# Patient Record
Sex: FEMALE | Race: WHITE | Employment: FULL TIME | URBAN - METROPOLITAN AREA
[De-identification: names, ages, dates, MRNs, and addresses within clinical notes are randomized per-mention and may not be internally consistent; named-entity substitution may affect disease eponyms.]

---

## 2019-03-05 ENCOUNTER — ANESTHESIA EVENT (OUTPATIENT)
Dept: SURGERY | Age: 49
End: 2019-03-05
Payer: COMMERCIAL

## 2019-03-05 RX ORDER — SODIUM CHLORIDE 0.9 % (FLUSH) 0.9 %
5-40 SYRINGE (ML) INJECTION EVERY 8 HOURS
Status: CANCELLED | OUTPATIENT
Start: 2019-03-05

## 2019-03-05 RX ORDER — SODIUM CHLORIDE 0.9 % (FLUSH) 0.9 %
5-40 SYRINGE (ML) INJECTION AS NEEDED
Status: CANCELLED | OUTPATIENT
Start: 2019-03-05

## 2019-03-05 NOTE — H&P
HPI:   1/9/2013- Right/Left double bunionectomies. . ~2 months ago right big toe pain    Location of pain - great toe      Type/severity pain -  aching burning    Aggravating factors -  shoes; prolonged standing/walking  Alleviating factors -  rest     PMSFH:  Included on the patient history form ; reviewed   MEDS:   Lexapro  ALLERGIES:  NKDA  PMH:  Depression  SOCIAL: Teacher, , nonsmoker    ROS:  Per POA form: positive and negative system reviews were discussed. I tried to relate any positive system review to the musculoskeletal complaint. For all others, recommend the PCP or any specialists    PE: The physical exam encompasses evaluation of both lower extremities:  Patient is alert and oriented. Nutrition, appearance and mood all okay. RESPIRATIONS:  Unlabored with no obvious shortness of breath. CV:  Appears to have pedal pulses, color, capillary refill, subungual color. Varicosities     NEURO:  No abnormal reflexes or clonus. Sensation appears mostly intact to light touch and sharp/dull discrimination. SLR negative. No popliteal tenderness     SKIN:  Age nails; no swelling; no keloids; MTP thick      MS:  Standing = HVI. Gait = limited push off roll    Right big toe = 5-40 painful MTP motion; Left big toe 20-50 with no pain     Okay ankle, subtalar, tarsal motion. 5/5 strength ; no instability or crepitance     XR: Right, Left side - Standing AP, lateral, oblique of the foot taken prior w/ 1st MTP joint arthritis/spurring. XR Impression:      As noted above    DIAGNOSIS: Right bunion, hallux rigidus          The patient and I discussed the above diagnoses with surgical and conservative options.   Discussed Jakob Manley Dansko shoes       Injection - offered today   Medication - Lujans    Discussed multiple surgical options - cheilectomy vs. fusion vs. joint replacement      Surgery - Right   big toe cheilectomy implant      big toe phalangeal osteotomy     op - anesthesia choice - 1 hour sag. saw, drill twists, K-wires, bone wax, Cartiva implant           The patient understands the diagnosis of w/ conservative and surgical treatment. Surgery, the risks and complications, and the expected postoperative course are all outlined. Understands the fact that this is arthritis and, that through the non-fusion surgical procedure,  I simply will try to decrease the prominence, decreases the impingement and pain and improve toe motion. The patient also understands that I cannot alter the long-term outcome of arthritis and that arthritis is something that will progress in the future which may necessitate a fusion (outlined and offered). The patient is aware and accepts all of it as a condition of treatment. Understands WB in postop shoe and the fact that it may take a year for all the swelling to resolve.   The patient accepts and understands future fusion or implant

## 2019-03-06 ENCOUNTER — HOSPITAL ENCOUNTER (OUTPATIENT)
Age: 49
Setting detail: OUTPATIENT SURGERY
Discharge: HOME | End: 2019-03-06
Attending: ORTHOPAEDIC SURGERY | Admitting: ORTHOPAEDIC SURGERY
Payer: COMMERCIAL

## 2019-03-06 ENCOUNTER — ANESTHESIA (OUTPATIENT)
Dept: SURGERY | Age: 49
End: 2019-03-06
Payer: COMMERCIAL

## 2019-03-06 VITALS
SYSTOLIC BLOOD PRESSURE: 144 MMHG | DIASTOLIC BLOOD PRESSURE: 96 MMHG | HEART RATE: 64 BPM | RESPIRATION RATE: 16 BRPM | TEMPERATURE: 97.4 F | OXYGEN SATURATION: 98 %

## 2019-03-06 PROCEDURE — 74011250636 HC RX REV CODE- 250/636: Performed by: PHYSICIAN ASSISTANT

## 2019-03-06 PROCEDURE — 77030002916 HC SUT ETHLN J&J -A: Performed by: ORTHOPAEDIC SURGERY

## 2019-03-06 PROCEDURE — 76010000161 HC OR TIME 1 TO 1.5 HR INTENSV-TIER 1: Performed by: ORTHOPAEDIC SURGERY

## 2019-03-06 PROCEDURE — 76210000063 HC OR PH I REC FIRST 0.5 HR: Performed by: ORTHOPAEDIC SURGERY

## 2019-03-06 PROCEDURE — 76210000020 HC REC RM PH II FIRST 0.5 HR: Performed by: ORTHOPAEDIC SURGERY

## 2019-03-06 PROCEDURE — 77030018836 HC SOL IRR NACL ICUM -A: Performed by: ORTHOPAEDIC SURGERY

## 2019-03-06 PROCEDURE — 76060000033 HC ANESTHESIA 1 TO 1.5 HR: Performed by: ORTHOPAEDIC SURGERY

## 2019-03-06 PROCEDURE — 76942 ECHO GUIDE FOR BIOPSY: CPT | Performed by: ORTHOPAEDIC SURGERY

## 2019-03-06 PROCEDURE — C1776 JOINT DEVICE (IMPLANTABLE): HCPCS | Performed by: ORTHOPAEDIC SURGERY

## 2019-03-06 PROCEDURE — 74011250636 HC RX REV CODE- 250/636

## 2019-03-06 PROCEDURE — 76010010054 HC POST OP PAIN BLOCK: Performed by: ORTHOPAEDIC SURGERY

## 2019-03-06 PROCEDURE — 77030006788 HC BLD SAW OSC STRY -B: Performed by: ORTHOPAEDIC SURGERY

## 2019-03-06 PROCEDURE — 74011250636 HC RX REV CODE- 250/636: Performed by: ANESTHESIOLOGY

## 2019-03-06 PROCEDURE — 77030000032 HC CUF TRNQT ZIMM -B: Performed by: ORTHOPAEDIC SURGERY

## 2019-03-06 PROCEDURE — 77030031139 HC SUT VCRL2 J&J -A: Performed by: ORTHOPAEDIC SURGERY

## 2019-03-06 PROCEDURE — 74011250637 HC RX REV CODE- 250/637: Performed by: ANESTHESIOLOGY

## 2019-03-06 DEVICE — 10 MM SYNTHETIC CARTILAGE IMPLANT
Type: IMPLANTABLE DEVICE | Site: FOOT | Status: FUNCTIONAL
Brand: CARTIVA SYNTHETIC CARTILAGE IMPLANT

## 2019-03-06 RX ORDER — ONDANSETRON 2 MG/ML
INJECTION INTRAMUSCULAR; INTRAVENOUS AS NEEDED
Status: DISCONTINUED | OUTPATIENT
Start: 2019-03-06 | End: 2019-03-06 | Stop reason: HOSPADM

## 2019-03-06 RX ORDER — ROPIVACAINE HYDROCHLORIDE 5 MG/ML
INJECTION, SOLUTION EPIDURAL; INFILTRATION; PERINEURAL
Status: COMPLETED | OUTPATIENT
Start: 2019-03-06 | End: 2019-03-06

## 2019-03-06 RX ORDER — MIDAZOLAM HYDROCHLORIDE 1 MG/ML
INJECTION, SOLUTION INTRAMUSCULAR; INTRAVENOUS AS NEEDED
Status: DISCONTINUED | OUTPATIENT
Start: 2019-03-06 | End: 2019-03-06 | Stop reason: HOSPADM

## 2019-03-06 RX ORDER — SODIUM CHLORIDE, SODIUM LACTATE, POTASSIUM CHLORIDE, CALCIUM CHLORIDE 600; 310; 30; 20 MG/100ML; MG/100ML; MG/100ML; MG/100ML
100 INJECTION, SOLUTION INTRAVENOUS CONTINUOUS
Status: DISCONTINUED | OUTPATIENT
Start: 2019-03-06 | End: 2019-03-06 | Stop reason: HOSPADM

## 2019-03-06 RX ORDER — MIDAZOLAM HYDROCHLORIDE 1 MG/ML
2 INJECTION, SOLUTION INTRAMUSCULAR; INTRAVENOUS
Status: COMPLETED | OUTPATIENT
Start: 2019-03-06 | End: 2019-03-06

## 2019-03-06 RX ORDER — HYDROCODONE BITARTRATE AND ACETAMINOPHEN 7.5; 325 MG/1; MG/1
1 TABLET ORAL AS NEEDED
Status: DISCONTINUED | OUTPATIENT
Start: 2019-03-06 | End: 2019-03-06 | Stop reason: HOSPADM

## 2019-03-06 RX ORDER — FENTANYL CITRATE 50 UG/ML
100 INJECTION, SOLUTION INTRAMUSCULAR; INTRAVENOUS ONCE
Status: COMPLETED | OUTPATIENT
Start: 2019-03-06 | End: 2019-03-06

## 2019-03-06 RX ORDER — LIDOCAINE HYDROCHLORIDE 10 MG/ML
0.1 INJECTION INFILTRATION; PERINEURAL AS NEEDED
Status: DISCONTINUED | OUTPATIENT
Start: 2019-03-06 | End: 2019-03-06 | Stop reason: HOSPADM

## 2019-03-06 RX ORDER — OXYCODONE HYDROCHLORIDE 5 MG/1
5 TABLET ORAL
Status: DISCONTINUED | OUTPATIENT
Start: 2019-03-06 | End: 2019-03-06 | Stop reason: HOSPADM

## 2019-03-06 RX ORDER — PROPOFOL 10 MG/ML
INJECTION, EMULSION INTRAVENOUS
Status: DISCONTINUED | OUTPATIENT
Start: 2019-03-06 | End: 2019-03-06 | Stop reason: HOSPADM

## 2019-03-06 RX ORDER — SODIUM CHLORIDE 0.9 % (FLUSH) 0.9 %
5-40 SYRINGE (ML) INJECTION AS NEEDED
Status: DISCONTINUED | OUTPATIENT
Start: 2019-03-06 | End: 2019-03-06 | Stop reason: HOSPADM

## 2019-03-06 RX ORDER — ROPIVACAINE HYDROCHLORIDE 10 MG/ML
INJECTION EPIDURAL; INFILTRATION; PERINEURAL
Status: COMPLETED | OUTPATIENT
Start: 2019-03-06 | End: 2019-03-06

## 2019-03-06 RX ORDER — NALOXONE HYDROCHLORIDE 0.4 MG/ML
0.1 INJECTION, SOLUTION INTRAMUSCULAR; INTRAVENOUS; SUBCUTANEOUS AS NEEDED
Status: DISCONTINUED | OUTPATIENT
Start: 2019-03-06 | End: 2019-03-06 | Stop reason: HOSPADM

## 2019-03-06 RX ORDER — PROPOFOL 10 MG/ML
INJECTION, EMULSION INTRAVENOUS AS NEEDED
Status: DISCONTINUED | OUTPATIENT
Start: 2019-03-06 | End: 2019-03-06 | Stop reason: HOSPADM

## 2019-03-06 RX ORDER — SODIUM CHLORIDE 0.9 % (FLUSH) 0.9 %
5-40 SYRINGE (ML) INJECTION EVERY 8 HOURS
Status: DISCONTINUED | OUTPATIENT
Start: 2019-03-06 | End: 2019-03-06 | Stop reason: HOSPADM

## 2019-03-06 RX ORDER — HYDROMORPHONE HYDROCHLORIDE 2 MG/ML
0.5 INJECTION, SOLUTION INTRAMUSCULAR; INTRAVENOUS; SUBCUTANEOUS
Status: DISCONTINUED | OUTPATIENT
Start: 2019-03-06 | End: 2019-03-06 | Stop reason: HOSPADM

## 2019-03-06 RX ORDER — FAMOTIDINE 20 MG/1
20 TABLET, FILM COATED ORAL ONCE
Status: COMPLETED | OUTPATIENT
Start: 2019-03-06 | End: 2019-03-06

## 2019-03-06 RX ORDER — CEFAZOLIN SODIUM/WATER 2 G/20 ML
2 SYRINGE (ML) INTRAVENOUS ONCE
Status: COMPLETED | OUTPATIENT
Start: 2019-03-06 | End: 2019-03-06

## 2019-03-06 RX ORDER — SODIUM CHLORIDE 9 MG/ML
25 INJECTION, SOLUTION INTRAVENOUS CONTINUOUS
Status: DISCONTINUED | OUTPATIENT
Start: 2019-03-06 | End: 2019-03-06 | Stop reason: HOSPADM

## 2019-03-06 RX ADMIN — Medication 2 G: at 15:54

## 2019-03-06 RX ADMIN — PROPOFOL 140 MCG/KG/MIN: 10 INJECTION, EMULSION INTRAVENOUS at 16:03

## 2019-03-06 RX ADMIN — ROPIVACAINE HYDROCHLORIDE 15 ML: 10 INJECTION EPIDURAL; INFILTRATION; PERINEURAL at 14:39

## 2019-03-06 RX ADMIN — ROPIVACAINE HYDROCHLORIDE 10 ML: 5 INJECTION, SOLUTION EPIDURAL; INFILTRATION; PERINEURAL at 14:41

## 2019-03-06 RX ADMIN — MIDAZOLAM HYDROCHLORIDE 2 MG: 2 INJECTION, SOLUTION INTRAMUSCULAR; INTRAVENOUS at 14:33

## 2019-03-06 RX ADMIN — PROPOFOL 50 MG: 10 INJECTION, EMULSION INTRAVENOUS at 16:03

## 2019-03-06 RX ADMIN — MIDAZOLAM HYDROCHLORIDE 2 MG: 1 INJECTION, SOLUTION INTRAMUSCULAR; INTRAVENOUS at 15:54

## 2019-03-06 RX ADMIN — FENTANYL CITRATE 100 MCG: 50 INJECTION INTRAMUSCULAR; INTRAVENOUS at 14:33

## 2019-03-06 RX ADMIN — SODIUM CHLORIDE, SODIUM LACTATE, POTASSIUM CHLORIDE, AND CALCIUM CHLORIDE 100 ML/HR: 600; 310; 30; 20 INJECTION, SOLUTION INTRAVENOUS at 13:45

## 2019-03-06 RX ADMIN — FAMOTIDINE 20 MG: 20 TABLET ORAL at 14:30

## 2019-03-06 RX ADMIN — ONDANSETRON 4 MG: 2 INJECTION INTRAMUSCULAR; INTRAVENOUS at 16:46

## 2019-03-06 NOTE — ANESTHESIA PREPROCEDURE EVALUATION
Anesthetic History               Review of Systems / Medical History  Patient summary reviewed    Pulmonary                   Neuro/Psych         Psychiatric history (Depression)     Cardiovascular                  Exercise tolerance: >4 METS     GI/Hepatic/Renal                Endo/Other             Other Findings            Physical Exam    Airway  Mallampati: II    Neck ROM: normal range of motion   Mouth opening: Normal     Cardiovascular  Regular rate and rhythm,  S1 and S2 normal,  no murmur, click, rub, or gallop             Dental  No notable dental hx       Pulmonary  Breath sounds clear to auscultation               Abdominal         Other Findings            Anesthetic Plan    ASA: 1  Anesthesia type: total IV anesthesia            Anesthetic plan and risks discussed with: Patient

## 2019-03-06 NOTE — H&P
Outpatient Surgery History and Physical:  Piedad Fair     CHIEF COMPLAINT:   LE    PE:     Visit Vitals  LMP 12/31/2012       Heart:  No noted problems   Lungs:  No noted problems        Past Medical History: There are no active problems to display for this patient. Surgical History:   Past Surgical History:   Procedure Laterality Date    ABDOMEN SURGERY PROC UNLISTED      tiffany    HX BUNIONECTOMY Bilateral     HX ORTHOPAEDIC      HX PARTIAL HYSTERECTOMY         Social History: Patient  reports that  has never smoked. she has never used smokeless tobacco. She reports that she does not drink alcohol or use drugs. Family History:   Family History   Problem Relation Age of Onset    Heart Disease Father 62        MI       Allergies: Reviewed per EMR  No Known Allergies    Medications:    No current facility-administered medications on file prior to encounter. Current Outpatient Medications on File Prior to Encounter   Medication Sig    escitalopram (LEXAPRO) 20 mg tablet Take 20 mg by mouth daily. Indications: pm       The surgery is planned for the Right big toe . History and physical have been reviewed. There have been no significant  changes since the completion of the updated history and physical.    Necessity for the procedure/care is still present and the updated history and physical office notes outline the full long term history of the problem. Please see the updated office notes for the full musculoskeletal examination and surgical plan.     Signed By: Pushpa Hodge MD     March 6, 2019 8:13 AM

## 2019-03-06 NOTE — ANESTHESIA PROCEDURE NOTES
Peripheral Block    Start time: 3/6/2019 2:39 PM  Performed by: Marylene Gehrig, MD  Authorized by: Marylene Gehrig, MD       Pre-procedure: Indications: at surgeon's request and post-op pain management    Preanesthetic Checklist: patient identified, risks and benefits discussed, site marked, timeout performed, anesthesia consent given and patient being monitored    Timeout Time: 14:39          Block Type:   Block Type:   Adductor canal  Laterality:  Right  Monitoring:  Continuous pulse ox, frequent vital sign checks, heart rate, oxygen and responsive to questions  Injection Technique:  Single shot  Procedures: ultrasound guided    Patient Position: supine  Prep: DuraPrep    Location:  Mid thigh  Needle Type:  Stimuplex  Needle Gauge:  20 G  Needle Localization:  Nerve stimulator and ultrasound guidance  Motor Response: minimal motor response >0.4 mA      Assessment:  Number of attempts:  1  Injection Assessment:  Incremental injection every 5 mL, local visualized surrounding nerve on ultrasound, negative aspiration for blood, no intravascular symptoms, no paresthesia and ultrasound image on chart  Patient tolerance:  Patient tolerated the procedure well with no immediate complications

## 2019-03-06 NOTE — DISCHARGE INSTRUCTIONS
INSTRUCTIONS FOLLOWING FOOT SURGERY    ACTIVITY  Elevate foot. No Ice  Protected partial weight bearing on the heel only as tolerated in post op shoe after full sensation returns. Let the office know if dressing gets saturated with water . DIET  Day of Surgery: Clear liquids until no nausea or vomiting; then light, bland diet (Baked chicken, plain rice, grits, scrambled egg, toast). Nothing Greasy, fried or spicy today  Advance to regular diet on second day, unless your doctor orders otherwise. PAIN  Take pain medications as directed by your doctor. Call your doctor if pain is NOT relieved by medication. PAIN MED SIDE EFFECTS  Constipation: Lots of fluids, try prune juice, then OTC stool softeners if necessary  Nausea: Take medication with food. DRESSING CARE Keep dry and in place until follow up appointment    CALL YOUR DOCTOR IF YOU HAVE  Excessive bleeding that does not stop after holding mild pressure over the area. Temperature of 101 degrees or above. Redness, excessive swelling or bruising, and/or green or yellow, smelly discharge from incision. Loss of sensation - cold, white or blue toes. AFTER ANESTHESIA  For the first 24 hours and while taking narcotics for pain: DO NOT Drive, Drink Alcoholic beverages, or make important Decisions. Be aware of dizziness following anesthesia and while taking pain medication. Preventing Infection at Home  We care about preventing infection and avoiding the spread of germs - not only when you are in the hospital but also when you return home. When you return home from the hospital, its important to take the following steps to help prevent infection and avoid spreading germs that could infect you and others. Ask everyone in your home to follow these guidelines, too. Clean Your Hands  · Clean your hands whenever your hands are visibly dirty, before you eat, before or after touching your mouth, nose or eyes, and before preparing food.  Clean them after contact with body fluids, using the restroom, touching animals or changing diapers. · When washing hands, wet them with warm water and work up a lather. Rub hands for at least 15 seconds, then rinse them and pat them dry with a clean towel or paper towel. · When using hand sanitizers, it should take about 15 seconds to rub your hands dry. If not, you probably didnt apply enough . Cover Your Sneeze or Cough  Germs are released into the air whenever you sneeze or cough. To prevent the spread of infection:  · Turn away from other people before coughing or sneezing. · Cover your mouth or nose with a tissue when you cough or sneeze. Put the tissue in the trash. · If you dont have a tissue, cough or sneeze into your upper sleeve, not your hands. · Always clean your hands after coughing or sneezing. Care for Wounds  Your skin is your bodys first line of defense against germs, but an open wound leaves an easy way for germs to enter your body. To prevent infection:  · Clean your hands before and after changing wound dressings, and wear gloves to change dressings if recommended by your doctor. · Take special care with IV lines or other devices inserted into the body. If you must touch them, clean your hands first.  · Follow any specific instructions from your doctor to care for your wounds. Contact your doctor if you experience any signs of infection, such as fever or increased redness at the surgical or wound site. Keep a Clean Home  · Clean or wipe commonly touched hard surfaces like door handles, sinks, tabletops, phones and TV remotes. · Use products labeled disinfectant to kill harmful bacteria and viruses. · Use a clean cloth or paper towel to clean and dry surfaces. Wiping surfaces with a dirty dishcloth, sponge or towel will only spread germs. · Never share toothbrushes, tracy, drinking glasses, utensils, razor blades, face cloths or bath towels to avoid spreading germs.   · Be sure that the linens that you sleep on are clean. · Keep pets away from wounds and wash your hands after touching pets, their toys or bedding. We care about you and your health. Remember, preventing infections is a team effort between you, your family, friends and health care providers. DISCHARGE SUMMARY from Nurse    PATIENT INSTRUCTIONS:    After general anesthesia or intravenous sedation, for 24 hours or while taking prescription Narcotics:  · Limit your activities  · Do not drive and operate hazardous machinery  · Do not make important personal or business decisions  · Do  not drink alcoholic beverages  · If you have not urinated within 8 hours after discharge, please contact your surgeon on call. *  Please give a list of your current medications to your Primary Care Provider. *  Please update this list whenever your medications are discontinued, doses are      changed, or new medications (including over-the-counter products) are added. *  Please carry medication information at all times in case of emergency situations. These are general instructions for a healthy lifestyle:    No smoking/ No tobacco products/ Avoid exposure to second hand smoke    Surgeon General's Warning:  Quitting smoking now greatly reduces serious risk to your health. Obesity, smoking, and sedentary lifestyle greatly increases your risk for illness    A healthy diet, regular physical exercise & weight monitoring are important for maintaining a healthy lifestyle    You may be retaining fluid if you have a history of heart failure or if you experience any of the following symptoms:  Weight gain of 3 pounds or more overnight or 5 pounds in a week, increased swelling in our hands or feet or shortness of breath while lying flat in bed. Please call your doctor as soon as you notice any of these symptoms; do not wait until your next office visit.     Recognize signs and symptoms of STROKE:    F-face looks uneven    A-arms unable to move or move unevenly    S-speech slurred or non-existent    T-time-call 911 as soon as signs and symptoms begin-DO NOT go       Back to bed or wait to see if you get better-TIME IS BRAIN.

## 2019-03-06 NOTE — ANESTHESIA PROCEDURE NOTES
Peripheral Block    Start time: 3/6/2019 2:33 PM  End time: 3/6/2019 2:39 PM  Performed by: Fariba Rajan MD  Authorized by: Fariba Rajan MD       Pre-procedure:    Indications: at surgeon's request and post-op pain management    Preanesthetic Checklist: patient identified, risks and benefits discussed, site marked, timeout performed, anesthesia consent given and patient being monitored    Timeout Time: 14:33          Block Type:   Block Type:  Popliteal  Laterality:  Right  Monitoring:  Continuous pulse ox, frequent vital sign checks, heart rate, responsive to questions and oxygen  Injection Technique:  Single shot  Procedures: ultrasound guided and nerve stimulator    Prep: DuraPrep    Location:  Lower thigh  Needle Type:  Stimuplex  Needle Gauge:  20 G  Needle Localization:  Nerve stimulator and ultrasound guidance  Motor Response: minimal motor response >0.4 mA      Assessment:  Number of attempts:  1  Injection Assessment:  Incremental injection every 5 mL, local visualized surrounding nerve on ultrasound, negative aspiration for blood, no intravascular symptoms, no paresthesia and ultrasound image on chart  Patient tolerance:  Patient tolerated the procedure well with no immediate complications

## 2019-03-06 NOTE — ANESTHESIA POSTPROCEDURE EVALUATION
Procedure(s):  RIGHT BIG TOE CHEILECTOMY  RIGHT BIG TOE PHALANGEAL OSTEOTOMY/ CHOICE.     Anesthesia Post Evaluation        Patient location during evaluation: PACU  Patient participation: complete - patient participated  Level of consciousness: awake and alert  Pain management: adequate  Airway patency: patent  Anesthetic complications: no  Cardiovascular status: acceptable  Respiratory status: acceptable  Hydration status: acceptable  Post anesthesia nausea and vomiting:  none      Visit Vitals  /86   Pulse 64   Temp 36.3 °C (97.4 °F)   Resp 14   SpO2 98%

## 2019-03-06 NOTE — PROGRESS NOTES
made Pre-Op prayer visit. Pt was alert and verbal with no pain level expressed or observed. Pt's , Vi Dennison, was present.  provided spiritual care through presence, pastoral conversation, prayer, and assurance of prayer.

## 2019-03-07 NOTE — OP NOTES
[facility]  OPERATIVE REPORT    Name:  Rayna Wells  MR#:  [id number]  :  1970  ACCOUNT #:  [de-identified]  DATE OF SERVICE:  2019    PREOPERATIVE DIAGNOSIS:  Right hallux rigidus. POSTOPERATIVE DIAGNOSIS:  Right hallux rigidus. PROCEDURE PERFORMED:  Right big toe cheilectomy, 10-mm Cartiva implant, phalangeal closing wedge osteotomy. SURGEON:  Virginia Montelongo. MD [place]    ASSISTANT:  None. ANESTHESIA:  Regional.    COMPLICATIONS:  None. SPECIMENS REMOVED:  None. IMPLANTS:  A 10-mm Cartiva. ESTIMATED BLOOD LOSS:  Minimal.    FLUIDS:  Crystalloid. DRAINS:  None. TOURNIQUET TIME:  Less than an hour. FINDINGS:  Intraoperatively, the patient had collapse arthritis throughout her metatarsophalangeal joint. The arthritis did extend into the sesamoid junction, but I felt that it was worth doing a Cartiva instead of a fusion of the big toe. PROCEDURE:  After successful induction of regional anesthesia, the right leg was scrubbed, prepped, and draped in usual sterile fashion. Antibiotic issued. Time-out procedure, identification, and surgical markings were done by me. The right leg was addressed with a medial approach. Findings noted above were seen. I was able to do a closing wedge osteotomy of 2 mm of the proximal phalanx, held with 0 Vicryl through drill holes. Cartiva implant was inserted after cheilectomy to allow unabated motion of the joint. She still had tightness in the sesamoid junction, but after insertion of a 10-mm Cartiva implant leaving 3-4 mm proud, I felt that that extra buffer allowed unabated 20-80 degree motion. Wound thoroughly cleaned and then closed with Vicryl and Ethilon. The patient placed in a sterile dressing and seemed to tolerate the procedures well.       Juliann Raphael MD [email or web address]  D:  2019 17:04  T:  2019 8:05  JOB #:  [id number]

## 2025-05-05 NOTE — PROGRESS NOTES
Name: Jodee Allen  YOB: 1970  Gender: female  MRN: [id number]    CC: Left ankle pain    HPI:   05/07/2025: New concern: Left medial ankle pain: Several week duration: No trauma    ROS/Meds/PSH/PMH/FH/SH: Only reviewed pertinent/relevant information      Tobacco:  reports that she has never smoked. She has never used smokeless tobacco.     Reviewed Test/Records/Documents:   01/09/2013: Bilateral bunionectomies  03/06/2019: Right great toe cheilectomy, 10 mm Cartiva implant, proximal phalangeal osteotomy  10/29/2019: Left posttraumatic dorsal foot injury: Distal 1/3 metatarsal injury, neuralgia  04/25/2025: [facility]: Dr. Hassan:     Physical Examination:  Patient appears to be alert and oriented with acceptable appearance.  No obvious distress or SOB  CV: Left LE = acceptable appearing vascular flow, color, capillary refill   Neuro: Left LE = appears to have intact light touch sensation   Skin: Left LE = medial ankle soft tissue swelling  MS: Standing: Plantigrade: Gait full  Left = medial ankle pain/soft tissue swelling  Left = no evidence of PTT or ATT pain  Left = full ankle/foot motion; 5/5 strength; no instability or crepitance    XR: Left: Standing AP lateral mortise ankle plus AP oblique foot taken today with medial talar dome OCD; posterior ankle/hindfoot ossifications x 2 age-indeterminate; retained opening wedge bunionectomy metatarsal plate with good healing; no acute appreciable pathology  XR Impression:  As above      Injection: Hold    Assessment:    Left medial ankle pain consistent with medial talar dome osteochondral defect    Plan:   The patient and I discussed the above assessment. We explored treatment options.     05/07/2025: Initial visit:  Left medial ankle pain several week duration consistent with medial talar dome osteochondral defect  Radiographs with posterior ankle/hindfoot ossification suspected loose bodies  She reports no locking or catching, but I believe

## 2025-05-07 ENCOUNTER — OFFICE VISIT (OUTPATIENT)
Dept: ORTHOPEDIC SURGERY | Age: 55
End: 2025-05-07
Payer: COMMERCIAL

## 2025-05-07 DIAGNOSIS — M25.572 ACUTE LEFT ANKLE PAIN: Primary | ICD-10-CM

## 2025-05-07 DIAGNOSIS — M95.8 OSTEOCHONDRAL DEFECT OF TALUS: ICD-10-CM

## 2025-05-07 PROCEDURE — 99203 OFFICE O/P NEW LOW 30 MIN: CPT | Performed by: ORTHOPAEDIC SURGERY

## 2025-05-13 ENCOUNTER — TELEPHONE (OUTPATIENT)
Dept: ORTHOPEDIC SURGERY | Age: 55
End: 2025-05-13

## 2025-05-13 NOTE — TELEPHONE ENCOUNTER
MRI LEFT ANKLE APPROVAL     Status   Current Status: Approved   Validity Period: 5/13/2025 - 6/12/2025   Authorization: [id number] (Ankle MRI (left))   Patient   Name: JAN PRYOR   Subscriber ID: [id number]   YOB: 1970   Gender: Female   Physician   Name: CHAR GREEN   Provider ID: [id number]      Rendering Provider   Name: [facility]   Phone:    Address[facility] [place] [address]   Fax: Not available   Rendering Provider ID: Not available   [email or web address] User   Name: [email or web address]   Company[facility] [employer] [facility] [facility]   Username: [id number]   Job Title: MRI AUTHORIZATIONS   Email: [email or web address]   Address: [address] [place] [address]   Supervisor Name: Connie Bailey   Supervisor Email: [email or web address]   Details   Date of Service: 5/20/2025   Auto Accident: No   Pend/Reject Code: E8   Out of State: n/a   Release of Info Code: Y   Out of Country: n/a   Employment Related: No   Another Party: No   Level of Service: Not Urgent   Exams: Ankle MRI (left)  - CPTs: 54238, 09858, 77311, 26257, 52655, 92833   ICD10: M25.572  M95.8   Reason: M25.572 (ICD-10-CM) - Acute left ankle pain M95.8 (ICD-10-CM) - Osteochondral defect of talus

## 2025-05-22 ENCOUNTER — CLINICAL DOCUMENTATION (OUTPATIENT)
Dept: ORTHOPEDIC SURGERY | Age: 55
End: 2025-05-22

## 2025-05-22 DIAGNOSIS — M95.8 OSTEOCHONDRAL DEFECT OF TALUS: ICD-10-CM

## 2025-05-22 DIAGNOSIS — M25.572 ACUTE LEFT ANKLE PAIN: Primary | ICD-10-CM

## 2025-05-22 NOTE — PROGRESS NOTES
05/20/2025: Left ankle MRI scan: Radiology Impression  1.  Findings compatible with high-grade partial to full-thickness cartilage defect of the medial talar dome measuring approximately 7 mm.   No underlying subchondral bone plate abnormality.   Chondromalacia and mild thinning of the opposing tibial plateau articular cartilage.  2.  Moderate tibiotalar joint effusion   Mild synovitis and small calcified intra-articular bodies in the posterior joint recess measuring 3 mm.   Additional small calcified intra-articular body in the anterior joint recess measuring 3 mm.  3.  Small posterior subtalar joint effusion.  4.  Findings which can be seen in the setting of sinus tarsi syndrome. Correlate clinically.  5.  Attenuated but intact ATFL.  6.  Mild scar remodeling and possible chronic partial thickness tear of the PTFL.   7.  Mild thickening/scar remodeling of the CFL. No tear.  8.  Findings suggestive of low to moderate grade deltoid ligament sprain. No tear. Mild overlying subcutaneous edema.  9.  Concern for inframalleolar split tear of the peroneus brevis tendon which reconstitutes distally.  10.  Accessory peroneus quartus.     Electronically signed by Magdalena Tabares MD    05/22/2025:   I called and discussed results of MRI scan  With the findings of her MRI scan, plan:  Cancel appointment with me  Surgical partner consultation

## 2025-06-13 ENCOUNTER — OFFICE VISIT (OUTPATIENT)
Dept: ORTHOPEDIC SURGERY | Age: 55
End: 2025-06-13
Payer: COMMERCIAL

## 2025-06-13 DIAGNOSIS — M95.8 OSTEOCHONDRAL DEFECT OF TALUS: Primary | ICD-10-CM

## 2025-06-13 PROCEDURE — 99214 OFFICE O/P EST MOD 30 MIN: CPT | Performed by: ORTHOPAEDIC SURGERY

## 2025-06-13 NOTE — PROGRESS NOTES
Name: Jodee Allen  YOB: 1970  Gender: female  MRN: [id number]    Summary:     Left ankle osteochondral defect    Proceed with left ankle arthroscopy with debridement and microfracture    General anesthesia with ankle block     History of Present Illness  The patient presents for evaluation of left ankle pain.    The patient reports bilateral ankle pain, with the left ankle being more symptomatic. The onset of pain occurred several months ago and has persisted since. The patient is seeking non-invasive treatment options. The intensity of the pain varies depending on the time of day, type of footwear, and duration of standing. Occasionally, the pain escalates to the point of being unable to bear weight. There is no history of ankle injury.    The patient has a history of bunionectomy on both feet, which have healed well.    PAST SURGICAL HISTORY:  Bunionectomy on both feet.    SOCIAL HISTORY  Recently retired from teaching. Does not smoke or use tobacco products.      ROS/Meds/PSH/PMH/FH/SH: see bottom of not for full  Patient Denies fever/chills, headache, visual changes, chest pain, shortness of breath, and nausea/vomiting/diarrhea     Tobacco:  reports that she has never smoked. She has never used smokeless tobacco.  No results found for: \"LABA1C\"    Physical Exam:  DP pulse: 2+  Neuro: normal SILT to s/s/sp/dp/t.  Reflexes normal: 1+ patella reflex bilaterally, 1+ achilles reflex bilaterally, negative babinski bilaterally. no signs of hyper reflexia or absent reflex  5/5 strength to AT/PT/CHRISTEN/EHL/FHL/EDL, No wounds or signs of Infection, and tender palpation over the medial ankle.  Good range of motion without popping or clicking but sensitivity and a mild amount of swelling noted the medial gutter.  Stable to varus and valgus stress.  Anterior drawer exam is stable       Imaging:   Advanced Imaging and Studies prior to this appoint are located at the bottom of the note  Interpretation

## 2025-06-17 DIAGNOSIS — M95.8 OSTEOCHONDRAL DEFECT OF TALUS: Primary | ICD-10-CM

## 2025-08-20 ENCOUNTER — TELEPHONE (OUTPATIENT)
Dept: ORTHOPEDIC SURGERY | Age: 55
End: 2025-08-20

## (undated) DEVICE — ZIMMER® STERILE DISPOSABLE TOURNIQUET CUFF WITH PLC, DUAL PORT, SINGLE BLADDER, 18 IN. (46 CM)

## (undated) DEVICE — SUT ETHLN 4-0 18IN PS2 BLK --

## (undated) DEVICE — AMD ANTIMICROBIAL BANDAGE ROLL,6 PLY: Brand: KERLIX

## (undated) DEVICE — Device

## (undated) DEVICE — ABDOMINAL PAD: Brand: DERMACEA

## (undated) DEVICE — STRETCH BANDAGE ROLL: Brand: DERMACEA

## (undated) DEVICE — BUTTON SWITCH PENCIL BLADE ELECTRODE, HOLSTER: Brand: EDGE

## (undated) DEVICE — PRECISION THIN (9.0 X 0.38 X 31.0MM)

## (undated) DEVICE — CURITY NON-ADHERENT STRIPS: Brand: CURITY

## (undated) DEVICE — REM POLYHESIVE ADULT PATIENT RETURN ELECTRODE: Brand: VALLEYLAB

## (undated) DEVICE — SUTURE VCRL SZ 0 L27IN ABSRB VLT L26MM CT-2 1/2 CIR J334H

## (undated) DEVICE — PRECISION THIN, OFFSET (5.5 X 0.38 X 25.0MM)

## (undated) DEVICE — AMD ANTIMICROBIAL GAUZE SPONGES,12 PLY USP TYPE VII, 0.2% POLYHEXAMETHYLENE BIGUANIDE HCI (PHMB): Brand: CURITY

## (undated) DEVICE — SOLUTION IV 1000ML 0.9% SOD CHL

## (undated) DEVICE — STERILE HOOK LOCK LATEX FREE ELASTIC BANDAGE 4INX5YD: Brand: HOOK LOCK™

## (undated) DEVICE — SUTURE ETHLN SZ 4-0 L18IN NONABSORBABLE BLK L19MM PS-2 3/8 1667H

## (undated) DEVICE — NON-ADHERING DRESSING: Brand: ADAPTIC®

## (undated) DEVICE — (D)PREP SKN CHLRAPRP APPL 26ML -- CONVERT TO ITEM 371833